# Patient Record
Sex: FEMALE | Race: WHITE | Employment: OTHER | URBAN - METROPOLITAN AREA
[De-identification: names, ages, dates, MRNs, and addresses within clinical notes are randomized per-mention and may not be internally consistent; named-entity substitution may affect disease eponyms.]

---

## 2017-06-09 ENCOUNTER — THERAPY VISIT (OUTPATIENT)
Dept: PHYSICAL THERAPY | Facility: CLINIC | Age: 77
End: 2017-06-09
Payer: MEDICARE

## 2017-06-09 DIAGNOSIS — G89.29 CHRONIC LEFT SHOULDER PAIN: Primary | ICD-10-CM

## 2017-06-09 DIAGNOSIS — M25.512 CHRONIC LEFT SHOULDER PAIN: Primary | ICD-10-CM

## 2017-06-09 PROCEDURE — 97161 PT EVAL LOW COMPLEX 20 MIN: CPT | Mod: GP | Performed by: PHYSICAL THERAPIST

## 2017-06-09 PROCEDURE — 97110 THERAPEUTIC EXERCISES: CPT | Mod: GP | Performed by: PHYSICAL THERAPIST

## 2017-06-09 PROCEDURE — G8982 BODY POS GOAL STATUS: HCPCS | Mod: GP | Performed by: PHYSICAL THERAPIST

## 2017-06-09 PROCEDURE — G8981 BODY POS CURRENT STATUS: HCPCS | Mod: GP | Performed by: PHYSICAL THERAPIST

## 2017-06-09 NOTE — LETTER
DEPARTMENT OF HEALTH AND HUMAN SERVICES  CENTERS FOR MEDICARE & MEDICAID SERVICES    PLAN/UPDATED PLAN OF PROGRESS FOR OUTPATIENT REHABILITATION    PATIENTS NAME:  Svetlana Mendoza     : 1940    PROVID[facility] NUMBER:    [id number]    [id number]     PROVIDER NAME: [facility] PHYSICAL THERAPY    MEDICAL RECORD NUMBER: [id number]     START OF CARE DATE:  SOC Date: 17   TYPE:  PT    PRIMARY/TREATMENT DIAGNOSIS: (Pertinent Medical Diagnosis)  Chronic left shoulder pain    VISITS FROM START OF CARE:  Rxs Used: 1     [place] [facility] Initial Evaluation    Subjective:  Patient is a 76 year old female presenting with rehab left shoulder hpi.   Svetlana Mendoza is a 76 year old female with a left shoulder condition. Condition occurred with:  Unknown cause.  Condition occurred: for unknown reasons. This is a chronic condition  Began 3-4 months ago. 17 Date of MD order.      Patient reports pain:  Upper arm.  Radiates to:  Lower arm and hand. Pain is described as sharp and burning and is intermittent and reported as 9/10.  Associated symptoms:  Loss of strength and loss of motion/stiffness. Pain is the same all the time. Exacerbated by: putting on her pants, reaching overhead, swimming, lifting weights. and relieved by heat (pain creams).  Since onset symptoms are unchanged.  Special testing: non reports. Previous treatment: injection   There was mild improvement following previous treatment. General health as reported by patient is good.      Pertinent medical history includes: Osteoarthritis, high blood pressure and thyroid problems. Medical allergies: no. Other surgeries include:  None reported. Current medications: Thyroid medication, muscle relaxants, meds to increase bone density and high blood pressure medication. Current occupation is Retired. Employment tasks: household tasks.    Barriers include:  None as reported by the patient.    Red flags:  None as reported by  the patient.                     Objective:       Shoulder Evaluation:  ROM:  AROM:    Flexion:  Left:  44    Right:  166  Abduction:  Left: 45   Right:  154  External Rotation:  Left:  64    Right:  62  Extension/Internal Rotation:  Left:  S1    Right:  T7      PROM:    Flexion:  Left:  155 with ERP         Abduction:  Left:  148 with ERP        Strength:    Flexion: Left:3/5 Weak/painful    Pain:    Right: 5/5  Strong/pain free     Pain:   Extension:  Left: 5/5  Strong/pain free    Pain:    Right: 5/5    Strong/pain free  Pain:  Abduction:  Left: 3/5  Weak/painful  Pain:    Right: 5/5     Pain:  Adduction:  Left:/5  Weak/painful   Pain:    Right: 5/5   Strong/pain free    Pain:  Internal Rotation:  Left:4/5   Weak/painful    Pain:    Right: 5/5   Strong/pain free    Pain:  External Rotation:   Left:4/5   Weak/painful    Pain:   Right:5/5   Strong/pain free    Pain:      Stability Testing:  normal    Special Tests:    Left shoulder positive for the following special tests:  Impingement and Bursal  Left shoulder negative for the following special tests:  Rotator cuff tear and Acromioclavicular    Palpation:    Left shoulder tenderness present at:  Supraspinatus  Left shoulder tenderness not present at: Biceps; Infraspinatus; Teres Minor; Subscapularis; Rhomboids; Upper Trap or Bicipital Groove    Mobility Tests:    Glenohumeral inferior left:  Hypomobile      Assessment/Plan:    Patient is a 76 year old female with left side shoulder complaints.    Patient has the following significant findings with corresponding treatment plan.                  Diagnosis 1:  Left shoulder pain / Rotator cuff strain  Pain -  hot/cold therapy, manual therapy, self management, education, directional preference exercise and home program  Decreased ROM/flexibility - manual therapy, therapeutic exercise, therapeutic activity and home program  Decreased joint mobility - manual therapy, therapeutic exercise, therapeutic activity and home  program  Decreased strength - therapeutic exercise, therapeutic activities and home program  Decreased function - therapeutic activities and home program  Impaired posture - neuro re-education, therapeutic activities and home program    Therapy Evaluation Codes:   1) History comprised of:   Personal factors that impact the plan of care:      Age and Language.    Comorbidity factors that impact the plan of care are:      None.     Medications impacting care: Muscle relaxant.  2) Examination of Body Systems comprised of:   Body structures and functions that impact the plan of care:      Shoulder.   Activity limitations that impact the plan of care are:      Dressing, Lifting and reaching.  3) Clinical presentation characteristics are:   Stable/Uncomplicated.  4) Decision-Making    Low complexity using standardized patient assessment instrument and/or measureable assessment of functional outcome.  Cumulative Therapy Evaluation is: Low complexity.    Previous and current functional limitations:  (See Goal Flow Sheet for this information)    Short term and Long term goals: (See Goal Flow Sheet for this information)     Communication ability:  Patient appears to be able to clearly communicate and understand verbal and written communication and follow directions correctly.    Treatment Explanation - The following has been discussed with the patient:   RX ordered/plan of care  Anticipated outcomes  Possible risks and side effects    This patient would benefit from PT intervention to resume normal activities.   Rehab potential is good.  Frequency:  1 X week, once daily  Duration:  for 8 weeks  Discharge Plan:  Achieve all LTG.  Independent in home treatment program.  Reach maximal therapeutic benefit.    Please refer to the daily flowsheet for treatment today, total treatment time and time spent performing 1:1 timed codes.     Caregiver Signature/Credentials _____________________________ Date ________       Treating  "Provider: Clark Lipscomb DPT     I have reviewed and certified the need for these services and plan of treatment while under my care.      PHYSICIAN'S SIGNATURE:   ____________________________________  Date___________                       Adam Frias      Certification period:  Beginning of Cert date period: 06/09/17 to  End of Cert period date: 09/06/17     Functional Level Progress Report: Please see attached \"Goal Flow sheet for Functional level.\"    ____X____ Continue Services or       ________ DC Services                Service dates: From  SOC Date: 06/09/17 date to present                         "

## 2017-06-09 NOTE — PROGRESS NOTES
[place] [facility] Initial Evaluation      Subjective:    Patient is a 76 year old female presenting with rehab left shoulder hpi.   Svetlana Mendoza is a 76 year old female with a left shoulder condition.  Condition occurred with:  Unknown cause.  Condition occurred: for unknown reasons.  This is a chronic condition  Began 3-4 months ago. 6/1/17 Date of MD order.    Patient reports pain:  Upper arm.  Radiates to:  Lower arm and hand.  Pain is described as sharp and burning and is intermittent and reported as 9/10.  Associated symptoms:  Loss of strength and loss of motion/stiffness. Pain is the same all the time.  Exacerbated by: putting on her pants, reaching overhead, swimming, lifting weights. and relieved by heat (pain creams).  Since onset symptoms are unchanged.  Special testing: non reports.  Previous treatment: injection   There was mild improvement following previous treatment.  General health as reported by patient is good.  Pertinent medical history includes:  Osteoarthritis, high blood pressure and thyroid problems.  Medical allergies: no.  Other surgeries include:  None reported.  Current medications:  Thyroid medication, muscle relaxants, meds to increase bone density and high blood pressure medication.  Current occupation is Retired  .    Employment tasks: household tasks.    Barriers include:  None as reported by the patient.    Red flags:  None as reported by the patient.                        Objective:    System                   Shoulder Evaluation:  ROM:  AROM:    Flexion:  Left:  44    Right:  166    Abduction:  Left: 45   Right:  154      External Rotation:  Left:  64    Right:  62            Extension/Internal Rotation:  Left:  S1    Right:  T7    PROM:    Flexion:  Left:  155 with ERP           Abduction:  Left:  148 with ERP                              Strength:    Flexion: Left:3/5 Weak/painful    Pain:    Right: 5/5  Strong/pain free     Pain:   Extension:  Left: 5/5   Strong/pain free    Pain:    Right: 5/5    Strong/pain free  Pain:  Abduction:  Left: 3/5  Weak/painful  Pain:    Right: 5/5     Pain:  Adduction:  Left:/5  Weak/painful   Pain:    Right: 5/5   Strong/pain free    Pain:  Internal Rotation:  Left:4/5   Weak/painful    Pain:    Right: 5/5   Strong/pain free    Pain:  External Rotation:   Left:4/5   Weak/painful    Pain:   Right:5/5   Strong/pain free    Pain:            Stability Testing:  normal      Special Tests:    Left shoulder positive for the following special tests:  Impingement and Bursal  Left shoulder negative for the following special tests:  Rotator cuff tear and Acromioclavicular    Palpation:    Left shoulder tenderness present at:  Supraspinatus  Left shoulder tenderness not present at: Biceps; Infraspinatus; Teres Minor; Subscapularis; Rhomboids; Upper Trap or Bicipital Groove    Mobility Tests:        Glenohumeral inferior left:  Hypomobile                                             General     ROS    Assessment/Plan:      Patient is a 76 year old female with left side shoulder complaints.    Patient has the following significant findings with corresponding treatment plan.                Diagnosis 1:  Left shoulder pain / Rotator cuff strain  Pain -  hot/cold therapy, manual therapy, self management, education, directional preference exercise and home program  Decreased ROM/flexibility - manual therapy, therapeutic exercise, therapeutic activity and home program  Decreased joint mobility - manual therapy, therapeutic exercise, therapeutic activity and home program  Decreased strength - therapeutic exercise, therapeutic activities and home program  Decreased function - therapeutic activities and home program  Impaired posture - neuro re-education, therapeutic activities and home program    Therapy Evaluation Codes:   1) History comprised of:   Personal factors that impact the plan of care:      Age and Language.    Comorbidity factors that impact the  plan of care are:      None.     Medications impacting care: Muscle relaxant.  2) Examination of Body Systems comprised of:   Body structures and functions that impact the plan of care:      Shoulder.   Activity limitations that impact the plan of care are:      Dressing, Lifting and reaching.  3) Clinical presentation characteristics are:   Stable/Uncomplicated.  4) Decision-Making    Low complexity using standardized patient assessment instrument and/or measureable assessment of functional outcome.  Cumulative Therapy Evaluation is: Low complexity.    Previous and current functional limitations:  (See Goal Flow Sheet for this information)    Short term and Long term goals: (See Goal Flow Sheet for this information)     Communication ability:  Patient appears to be able to clearly communicate and understand verbal and written communication and follow directions correctly.  Treatment Explanation - The following has been discussed with the patient:   RX ordered/plan of care  Anticipated outcomes  Possible risks and side effects  This patient would benefit from PT intervention to resume normal activities.   Rehab potential is good.    Frequency:  1 X week, once daily  Duration:  for 8 weeks  Discharge Plan:  Achieve all LTG.  Independent in home treatment program.  Reach maximal therapeutic benefit.    Please refer to the daily flowsheet for treatment today, total treatment time and time spent performing 1:1 timed codes.

## 2017-06-15 ENCOUNTER — THERAPY VISIT (OUTPATIENT)
Dept: PHYSICAL THERAPY | Facility: CLINIC | Age: 77
End: 2017-06-15
Payer: MEDICARE

## 2017-06-15 DIAGNOSIS — G89.29 CHRONIC LEFT SHOULDER PAIN: ICD-10-CM

## 2017-06-15 DIAGNOSIS — M25.512 CHRONIC LEFT SHOULDER PAIN: ICD-10-CM

## 2017-06-15 PROCEDURE — 97110 THERAPEUTIC EXERCISES: CPT | Mod: GP | Performed by: PHYSICAL THERAPIST

## 2017-06-15 NOTE — MR AVS SNAPSHOT
"              After Visit Summary   6/15/2017    Svetlana Mendoza    MRN: [id number]           Patient Information     Date Of Birth          1940        Visit Information        Provider Department      6/15/2017 1:30 PM Teresa Albright, PT [facility] Physical Therapy        Today's Diagnoses     Chronic left shoulder pain           Follow-ups after your visit        Your next 10 appointments already scheduled     Jun 29, 2017  1:30 PM CDT   PAYAL Extremity with Teresa Albright, PT   [facility] Physical Therapy ([facility]  )    [address]  [facility] [address] [phone]              Who to contact     If you have questions or need follow up information about today's clinic visit or your schedule please contact [facility] PHYSICAL THERAPY directly at [phone].  Normal or non-critical lab and imaging results will be communicated to you by MyChart, letter or phone within 4 business days after the clinic has received the results. If you do not hear from us within 7 days, please contact the clinic through MyChart or phone. If you have a critical or abnormal lab result, we will notify you by phone as soon as possible.  Submit refill requests through Call[employer] or call your pharmacy and they will forward the refill request to us. Please allow 3 business days for your refill to be completed.          Additional Information About Your Visit        MyChart Information     CallMD lets you send messages to your doctor, view your test results, renew your prescriptions, schedule appointments and more. To sign up, go to [email or web address] . Click on \"Log in\" on the left side of the screen, which will take you to the Welcome page. Then click on \"Sign up Now\" on the right side of the page.     You will be asked to enter the access code listed below, as well as some personal information. Please follow the directions to create your " username and password.     Your access code is: [email or web address]  Expires: 2017  1:58 PM     Your access code will  in 90 days. If you need help or a new code, please call your [place] clinic or [phone].        Care EveryWhere ID     This is your Care EveryWhere ID. This could be used by other organizations to access your [place] medical records  [id number]         Blood Pressure from Last 3 Encounters:   13 140/56   13 160/74    Weight from Last 3 Encounters:   13 59.8 kg (131 lb 14.4 oz)              We Performed the Following     THERAPEUTIC EXERCISES        Primary Care Provider Office Phone # Fax #    Ankita Coates [phone]       [facility] [address]  [facility] [address]        Thank you!     Thank you for choosing [facility] PHYSICAL THERAPY  for your care. Our goal is always to provide you with excellent care. Hearing back from our patients is one way we can continue to improve our services. Please take a few minutes to complete the written survey that you may receive in the mail after your visit with us. Thank you!             Your Updated Medication List - Protect others around you: Learn how to safely use, store and throw away your medicines at www.disposemymeds.org.          This list is accurate as of: 6/15/17  1:58 PM.  Always use your most recent med list.                   Brand Name Dispense Instructions for use    alendronate 70 MG tablet    FOSAMAX     Take 70 mg by mouth every 7 days       aspirin 81 MG tablet      Take by mouth daily       CALCIUM + D PO          Levothyroxine Sodium 50 MCG Caps          lisinopril-hydrochlorothiazide 20-12.5 MG per tablet    PRINZIDE/ZESTORETIC     Take 1 tablet by mouth daily       naproxen 500 MG tablet    NAPROSYN     Take by mouth 2 times daily (with meals)       omeprazole 20 MG tablet     30 tablet    Take 1 tablet (20 mg) by mouth daily as needed Take 30-60 minutes  before a meal.       simvastatin 40 MG tablet    ZOCOR    90 tablet    Take 1 tablet (40 mg) by mouth At Bedtime       vitamin D 2000 UNITS Caps

## 2018-01-09 ENCOUNTER — TRANSFERRED RECORDS (OUTPATIENT)
Dept: PHYSICAL THERAPY | Facility: CLINIC | Age: 78
End: 2018-01-09

## 2018-01-11 PROBLEM — G89.29 CHRONIC LEFT SHOULDER PAIN: Status: RESOLVED | Noted: 2017-06-09 | Resolved: 2018-01-11

## 2018-01-11 PROBLEM — M25.512 CHRONIC LEFT SHOULDER PAIN: Status: RESOLVED | Noted: 2017-06-09 | Resolved: 2018-01-11

## 2018-01-11 NOTE — PROGRESS NOTES
Subjective:  HPI                    Objective:  System    Physical Exam    General     ROS    Assessment/Plan:    DISCHARGE REPORT    Progress reporting period is from 6/9/17 to 6/15/17.   Patient was seen for 2 visits.  SUBJECTIVE  Subjective: Patient reports the upper arm is better.  The shoulder joint is sore intermittently, maybe related to exercises.  Reaching is easier.  Patient has no pain currently at rest.   Current Pain level: 0/10   Initial Pain level: 6/10   Changes in function: Yes, see goal flow sheet for change in function   Adverse reactions: None;   ,     The subjective and objective information are from the last SOAP note on this patient.    OBJECTIVE  Objective: AROM: 150 deg flex, 155 deg abd, 64 deg ER 5/10 ERP, ext/IR to T8,       ASSESSMENT/PLAN  Updated problem list and treatment plan: Diagnosis 1:  Shoulder pain  Pain -  home program  Decreased ROM/flexibility - home program  Decreased strength - home program  Decreased function - home program  STG/LTGs have been met or progress has been made towards goals:  Yes (See Goal flow sheet completed today.)  Assessment of Progress: Patient has not returned to therapy.  Current status is unknown and discharge G code cannot be reported.  Self Management Plans:  Patient has been instructed in a home treatment program.      The patient failed to complete scheduled/ordered appointments so current information is unknown.  We will discharge this patient from PT.    Recommendations:  Discharge with HEP.    Please refer to the daily flowsheet for treatment today, total treatment time and time spent performing 1:1 timed codes.

## 2018-01-15 ENCOUNTER — THERAPY VISIT (OUTPATIENT)
Dept: PHYSICAL THERAPY | Facility: CLINIC | Age: 78
End: 2018-01-15
Payer: MEDICARE

## 2018-01-15 DIAGNOSIS — M54.42 BILATERAL LOW BACK PAIN WITH LEFT-SIDED SCIATICA: Primary | ICD-10-CM

## 2018-01-15 PROCEDURE — G8981 BODY POS CURRENT STATUS: HCPCS | Mod: GP | Performed by: PHYSICAL THERAPIST

## 2018-01-15 PROCEDURE — 97161 PT EVAL LOW COMPLEX 20 MIN: CPT | Mod: GP | Performed by: PHYSICAL THERAPIST

## 2018-01-15 PROCEDURE — G8982 BODY POS GOAL STATUS: HCPCS | Mod: GP | Performed by: PHYSICAL THERAPIST

## 2018-01-15 PROCEDURE — 97110 THERAPEUTIC EXERCISES: CPT | Mod: GP | Performed by: PHYSICAL THERAPIST

## 2018-01-15 NOTE — LETTER
DEPARTMENT OF HEALTH AND HUMAN SERVICES  CENTERS FOR MEDICARE & MEDICAID SERVICES    PLAN/UPDATED PLAN OF PROGRESS FOR OUTPATIENT REHABILITATION    PATIENTS NAME:  Svetlana Mendoza   : 1940  PROVIDER NUMBER:    [id number]  [id number]   PROVIDER NAME: [facility] PHYSICAL THERAPY  MEDICAL RECORD NUMBER: [id number]   START OF CARE DATE:  SOC Date: 01/15/18   TYPE:  PT  PRIMARY/TREATMENT DIAGNOSIS: (Pertinent Medical Diagnosis)  Bilateral low back pain with left-sided sciatica    VISITS FROM START OF CARE:  Rxs Used: 1     [place] [facility] Initial Evaluation -- Lumbar    Date: January 15, 2018  Svetlana Mendoza is a 77 year old female with a lumbar condition.   Referral: 2018  Work mechanical stresses:    Employment status:  retired  Leisure mechanical stresses: walking  Functional disability score (TRU/STarT Back):    VAS score (0-10): 6  Patient goals/expectations:  Decrease back and leg pain  HISTORY:  Present symptoms: Central to B LBP, L leg pain  Pain quality (sharp/shooting/stabbing/aching/burning/cramping):  sharp   Paresthesia (yes/no):  no  Present since (onset date): Many years, worsening over the past 3 months.     Symptoms (improving/unchanging/worsening):  worsening.   Symptoms commenced as a result of: No apparent reason   Condition occurred in the following environment:   Home   Symptoms at onset (back/thigh/leg): B LB, B legs  Constant symptoms (back/thigh/leg):   Intermittent symptoms (back/thigh/leg): B LB, B legs  Symptoms are made worse with the following: Always Sitting 30 minutes, Sometimes rising sit to stand, and Always Lying   Symptoms are made better with the following: Sometimes Walking after sitting and Other - wearing belt  Disturbed sleep (yes/no):  no Sleeping postures (prone/sup/side R/L): B sides  Previous episodes (0/1-5/6-10/11+): 10+ Year of first episode: Many years  Previous history: Several year history of back pain  Previous  treatments: PT  Specific Questions:  Cough/Sneeze/Strain (pos/neg): neg  Bowel/Bladder (normal/abnormal): normal  Gait (normal/abnormal): normal  Medications (nil/NSAIDS/analg/steroids/anticoag/other):  None  Medical allergies:  None  General health (excellent/good/fair/poor):  good  PATIENTS NAME:  Svetlana Mendoza   : 1940    Pertinent medical history:  Rheumatoid Arthritis, Osteoarthritis, High blood pressure and Thyroid problems  Imaging (None/Xray/MRI/Other):  None  Recent or major surgery (yes/no):  no  Night pain (yes/no): no  Accidents (yes/no): no  Unexplained weight loss (yes/no): no  Barriers at home: no  Other red flags: no  EXAMINATION  Posture:   Sitting (good/fair/poor): fair  Standing (good/fair/poor):good  Lordosis (red/acc/normal): normal  Correction of posture (better/worse/no effect): better for awhile  Lateral Shift (right/left/nil): nil  Relevant (yes/no):    Other Observations:   Neurological:  Motor deficit:  Strong and equal B LE Reflexes:    Sensory deficit:  None   Dural signs:  (-)slump B  Movement Loss:   Nawaf Mod Min Nil Pain   Flexion x    Prod LBP   Extension  x   Dec LBP, NE on LE   Side Gliding R    x NE   Side Gliding L    x NE   Test Movements:   During: produces, abolishes, increases, decreases, no effect, centralizing, peripheralizing   After: better, worse, no better, no worse, no effect, centralized, peripheralized  Pretest symptoms standin/10 L LE   Symptoms During Symptoms After ROM increased ROM decreased No Effect   FIS Produces  LBP  No Worse         Rep FIS          EIS Decreases    No Better         Rep EIS Decreases    Better         Pretest symptoms lyin/10 L LE    Symptoms During Symptoms After ROM increased ROM decreased No Effect   BRIAN          Rep BRIAN          EIL Produces   LBP Worse         Rep EIL Decreases  Centralising    Better    x     If required, pretest symptoms:    Symptoms During Symptoms After ROM increased ROM decreased No Effect   SGIS  - R No Effect    No Effect         Rep SGIS - R          SGIS - L No Effect    No Effect         Rep SGIS - L          Static Tests:  Sitting slouched:     Sitting erect:    Standing slouched    Standing erect:    Lying prone in extension:  Dec Long sitting:    Other Tests:   Provisional Classification:  Derangement -B LB, symptoms below knee, varying symmetry  Principle of Management:  Education:  Centralization   Equipment provided:  Has a lumbar roll, reviewed use  Mechanical therapy (Y/N):  Y-assessing   Extension principle:  EIS, EIL 6x/day Lateral Principle:    Flexion principle:      Other:   ASSESSMENT/PLAN:  Patient is a 77 year old female with lumbar complaints.    Patient has the following significant findings with corresponding treatment plan.                Diagnosis 1:  LBP  Pain -  manual therapy, directional preference exercise and home program  Decreased ROM/flexibility - manual therapy, therapeutic exercise and home program  Decreased function - therapeutic activities and home program  Therapy Evaluation Codes:   1) History comprised of:   Personal factors that impact the plan of care:      Time since onset of symptoms.    Comorbidity factors that impact the plan of care are:      None.     Medications impacting care: None.  2) Examination of Body Systems comprised of:   Body structures and functions that impact the plan of care:      Lumbar spine.   Activity limitations that impact the plan of care are:      Bending and Sitting.  3) Clinical presentation characteristics are:   Stable/Uncomplicated.  4) Decision-Making    Low complexity using standardized patient assessment instrument and/or measureable assessment of functional outcome.  Cumulative Therapy Evaluation is: Low complexity.    Previous and current functional limitations:  (See Goal Flow Sheet for this information)    Short term and Long term goals: (See Goal Flow Sheet for this information)   PATIENTS NAME:  Svetlana Mendoza   :  "1940    Communication ability:  Patient appears to be able to clearly communicate and understand verbal and written communication and follow directions correctly.  Treatment Explanation - The following has been discussed with the patient:   RX ordered/plan of care  Anticipated outcomes  Possible risks and side effects  This patient would benefit from PT intervention to resume normal activities.   Rehab potential is good.  Frequency:  1 X week, once daily  Duration:  for 8 weeks  Discharge Plan:  Achieve all LTG.  Independent in home treatment program.  Reach maximal therapeutic benefit.        Caregiver Signature/Credentials _____________________________ Date ________      Treating Provider: Angelic Albright, PT   I have reviewed and certified the need for these services and plan of treatment while under my care.        PHYSICIAN'S SIGNATURE:   _________________________________________  Date___________   Nyasia Barlow MD    Certification period:  Beginning of Cert date period: 01/15/18 to  End of Cert period date: 04/14/18     Functional Level Progress Report: Please see attached \"Goal Flow sheet for Functional level.\"    ____X____ Continue Services or       ________ DC Services                Service dates: From  SOC Date: 01/15/18 date to present                         "

## 2018-01-15 NOTE — MR AVS SNAPSHOT
"              After Visit Summary   1/15/2018    Svetlana Mendoza    MRN: [id number]           Patient Information     Date Of Birth          1940        Visit Information        Provider Department      1/15/2018 10:20 AM Teresa Albright, PT [facility] Physical Therapy        Today's Diagnoses     Bilateral low back pain with left-sided sciatica    -  1       Follow-ups after your visit        Your next 10 appointments already scheduled     Jan 22, 2018  1:30 PM CST   PAYAL Spine with Teresa Albright, PT   [facility] Physical Therapy ([facility]  )    [address]  [facility] [address]   [phone]            Jan 29, 2018  1:30 PM CST   PAYAL Spine with Teresa Albright, PT   [facility] Physical Therapy ([facility]  )    [address]  [facility] [id number] [phone]              Who to contact     If you have questions or need follow up information about today's clinic visit or your schedule please contact [facility] PHYSICAL THERAPY directly at [phone].  Normal or non-critical lab and imaging results will be communicated to you by MyChart, letter or phone within 4 business days after the clinic has received the results. If you do not hear from us within 7 days, please contact the clinic through [employer] or phone. If you have a critical or abnormal lab result, we will notify you by phone as soon as possible.  Submit refill requests through Qianmi or call your pharmacy and they will forward the refill request to us. Please allow 3 business days for your refill to be completed.          Additional Information About Your Visit        [employer] Information     Qianmi lets you send messages to your doctor, view your test results, renew your prescriptions, schedule appointments and more. To sign up, go to [email or web address] . Click on \"Log in\" on the left side of the screen, which will take " "you to the Welcome page. Then click on \"Sign up Now\" on the right side of the page.     You will be asked to enter the access code listed below, as well as some personal information. Please follow the directions to create your username and password.     Your access code is: [email or web address]  Expires: 4/15/2018 12:13 PM     Your access code will  in 90 days. If you need help or a new code, please call your [facility] clinic or [phone].        Care EveryWhere ID     This is your Care EveryWhere ID. This could be used by other organizations to access your [facility] medical records  [id number]         Blood Pressure from Last 3 Encounters:   13 140/56   13 160/74    Weight from Last 3 Encounters:   13 59.8 kg (131 lb 14.4 oz)              We Performed the Following     HC PT EVAL, LOW COMPLEXITY     PAYAL INITIAL EVAL REPORT     PAYAL RE-CERT REPORT     THERAPEUTIC EXERCISES        Primary Care Provider Office Phone # Fax #    Ankita Coates MD PhD [phone]       [address]  [facility] [address]        Equal Access to Services     [facility] CenterKEYLA : Hadii aad ku hadasho Soomaali, waaxda luqadaha, qaybta kaalmada adeegyada, vinny lepe haydemarcusn yanely staleyn ah. So [facility] [phone].    ATENCIÓN: Si habla español, tiene a leavitt disposición servicios gratuitos de asistencia lingüística. Llame al [phone].    We comply with applicable federal civil rights laws and [place] laws. We do not discriminate on the basis of race, color, national origin, age, disability, sex, sexual orientation, or gender identity.            Thank you!     Thank you for choosing [facility] PHYSICAL THERAPY  for your care. Our goal is always to provide you with excellent care. Hearing back from our patients is one way we can continue to improve our services. Please take a few minutes to complete the written survey that you may receive in the mail after your visit with us. Thank you!      "        Your Updated Medication List - Protect others around you: Learn how to safely use, store and throw away your medicines at www.disposemymeds.org.          This list is accurate as of: 1/15/18 12:13 PM.  Always use your most recent med list.                   Brand Name Dispense Instructions for use Diagnosis    alendronate 70 MG tablet    FOSAMAX     Take 70 mg by mouth every 7 days        aspirin 81 MG tablet      Take by mouth daily        CALCIUM + D PO           Levothyroxine Sodium 50 MCG Caps           lisinopril-hydrochlorothiazide 20-12.5 MG per tablet    PRINZIDE/ZESTORETIC     Take 1 tablet by mouth daily        naproxen 500 MG tablet    NAPROSYN     Take by mouth 2 times daily (with meals)        omeprazole 20 MG tablet     30 tablet    Take 1 tablet (20 mg) by mouth daily as needed Take 30-60 minutes before a meal.    GERD (gastroesophageal reflux disease)       simvastatin 40 MG tablet    ZOCOR    90 tablet    Take 1 tablet (40 mg) by mouth At Bedtime    Hyperlipidemia LDL goal <130       vitamin D 2000 UNITS Caps

## 2018-01-15 NOTE — PROGRESS NOTES
[place] [facility] Initial Evaluation -- Lumbar    Date: January 15, 2018  Svetlana Mendoza is a 77 year old female with a lumbar condition.   Referral: 1/9/2018  Work mechanical stresses:    Employment status:  retired  Leisure mechanical stresses: walking  Functional disability score (TRU/STarT Back):    VAS score (0-10): 6  Patient goals/expectations:  Decrease back and leg pain    HISTORY:    Present symptoms: Central to B LBP, L leg pain  Pain quality (sharp/shooting/stabbing/aching/burning/cramping):  sharp   Paresthesia (yes/no):  no    Present since (onset date): Many years, worsening over the past 3 months.     Symptoms (improving/unchanging/worsening):  worsening.     Symptoms commenced as a result of: No apparent reason   Condition occurred in the following environment:   Home     Symptoms at onset (back/thigh/leg): B LB, B legs  Constant symptoms (back/thigh/leg):   Intermittent symptoms (back/thigh/leg): B LB, B legs    Symptoms are made worse with the following: Always Sitting 30 minutes, Sometimes rising sit to stand, and Always Lying   Symptoms are made better with the following: Sometimes Walking after sitting and Other - wearing belt    Disturbed sleep (yes/no):  no Sleeping postures (prone/sup/side R/L): B sides    Previous episodes (0/1-5/6-10/11+): 10+ Year of first episode: Many years    Previous history: Several year history of back pain  Previous treatments: PT      Specific Questions:  Cough/Sneeze/Strain (pos/neg): neg  Bowel/Bladder (normal/abnormal): normal  Gait (normal/abnormal): normal  Medications (nil/NSAIDS/analg/steroids/anticoag/other):  None  Medical allergies:  None  General health (excellent/good/fair/poor):  good  Pertinent medical history:  Rheumatoid Arthritis, Osteoarthritis, High blood pressure and Thyroid problems  Imaging (None/Xray/MRI/Other):  None  Recent or major surgery (yes/no):  no  Night pain (yes/no): no  Accidents (yes/no): no  Unexplained weight  loss (yes/no): no  Barriers at home: no  Other red flags: no    EXAMINATION    Posture:   Sitting (good/fair/poor): fair  Standing (good/fair/poor):good  Lordosis (red/acc/normal): normal  Correction of posture (better/worse/no effect): better for awhile    Lateral Shift (right/left/nil): nil  Relevant (yes/no):    Other Observations:     Neurological:    Motor deficit:  Strong and equal B LE Reflexes:    Sensory deficit:  None   Dural signs:  (-)slump B    Movement Loss:   Nawaf Mod Min Nil Pain   Flexion x    Prod LBP   Extension  x   Dec LBP, NE on LE   Side Gliding R    x NE   Side Gliding L    x NE     Test Movements:   During: produces, abolishes, increases, decreases, no effect, centralizing, peripheralizing   After: better, worse, no better, no worse, no effect, centralized, peripheralized    Pretest symptoms standin/10 L LE   Symptoms During Symptoms After ROM increased ROM decreased No Effect   FIS Produces  LBP  No Worse         Rep FIS          EIS Decreases    No Better         Rep EIS Decreases    Better         Pretest symptoms lyin/10 L LE    Symptoms During Symptoms After ROM increased ROM decreased No Effect   BRIAN          Rep BRIAN          EIL Produces   LBP Worse         Rep EIL Decreases  Centralising    Better    x     If required, pretest symptoms:    Symptoms During Symptoms After ROM increased ROM decreased No Effect   SGIS - R No Effect    No Effect         Rep SGIS - R          SGIS - L No Effect    No Effect         Rep SGIS - L            Static Tests:  Sitting slouched:     Sitting erect:    Standing slouched    Standing erect:    Lying prone in extension:  Dec Long sitting:      Other Tests:     Provisional Classification:  Derangement -B LB, symptoms below knee, varying symmetry    Principle of Management:  Education:  Centralization   Equipment provided:  Has a lumbar roll, reviewed use  Mechanical therapy (Y/N):  Y-assessing   Extension principle:  EIS, EIL 6x/day Lateral  Principle:    Flexion principle:      Other:     ASSESSMENT/PLAN:    Patient is a 77 year old female with lumbar complaints.    Patient has the following significant findings with corresponding treatment plan.                Diagnosis 1:  LBP  Pain -  manual therapy, directional preference exercise and home program  Decreased ROM/flexibility - manual therapy, therapeutic exercise and home program  Decreased function - therapeutic activities and home program    Therapy Evaluation Codes:   1) History comprised of:   Personal factors that impact the plan of care:      Time since onset of symptoms.    Comorbidity factors that impact the plan of care are:      None.     Medications impacting care: None.  2) Examination of Body Systems comprised of:   Body structures and functions that impact the plan of care:      Lumbar spine.   Activity limitations that impact the plan of care are:      Bending and Sitting.  3) Clinical presentation characteristics are:   Stable/Uncomplicated.  4) Decision-Making    Low complexity using standardized patient assessment instrument and/or measureable assessment of functional outcome.  Cumulative Therapy Evaluation is: Low complexity.    Previous and current functional limitations:  (See Goal Flow Sheet for this information)    Short term and Long term goals: (See Goal Flow Sheet for this information)     Communication ability:  Patient appears to be able to clearly communicate and understand verbal and written communication and follow directions correctly.  Treatment Explanation - The following has been discussed with the patient:   RX ordered/plan of care  Anticipated outcomes  Possible risks and side effects  This patient would benefit from PT intervention to resume normal activities.   Rehab potential is good.    Frequency:  1 X week, once daily  Duration:  for 8 weeks  Discharge Plan:  Achieve all LTG.  Independent in home treatment program.  Reach maximal therapeutic  benefit.    Please refer to the daily flowsheet for treatment today, total treatment time and time spent performing 1:1 timed codes.

## 2018-01-22 ENCOUNTER — THERAPY VISIT (OUTPATIENT)
Dept: PHYSICAL THERAPY | Facility: CLINIC | Age: 78
End: 2018-01-22
Payer: MEDICARE

## 2018-01-22 DIAGNOSIS — M54.42 BILATERAL LOW BACK PAIN WITH LEFT-SIDED SCIATICA: ICD-10-CM

## 2018-01-22 PROCEDURE — 97110 THERAPEUTIC EXERCISES: CPT | Mod: GP | Performed by: PHYSICAL THERAPIST

## 2018-01-22 PROCEDURE — 97140 MANUAL THERAPY 1/> REGIONS: CPT | Mod: GP | Performed by: PHYSICAL THERAPIST

## 2018-01-29 ENCOUNTER — THERAPY VISIT (OUTPATIENT)
Dept: PHYSICAL THERAPY | Facility: CLINIC | Age: 78
End: 2018-01-29
Payer: MEDICARE

## 2018-01-29 DIAGNOSIS — M54.42 BILATERAL LOW BACK PAIN WITH LEFT-SIDED SCIATICA: ICD-10-CM

## 2018-01-29 PROCEDURE — 97140 MANUAL THERAPY 1/> REGIONS: CPT | Mod: GP | Performed by: PHYSICAL THERAPIST

## 2018-01-29 PROCEDURE — 97110 THERAPEUTIC EXERCISES: CPT | Mod: GP | Performed by: PHYSICAL THERAPIST

## 2018-02-05 ENCOUNTER — THERAPY VISIT (OUTPATIENT)
Dept: PHYSICAL THERAPY | Facility: CLINIC | Age: 78
End: 2018-02-05
Payer: MEDICARE

## 2018-02-05 DIAGNOSIS — M54.42 BILATERAL LOW BACK PAIN WITH LEFT-SIDED SCIATICA: ICD-10-CM

## 2018-02-05 PROCEDURE — 97530 THERAPEUTIC ACTIVITIES: CPT | Mod: GP | Performed by: PHYSICAL THERAPIST

## 2018-02-05 PROCEDURE — 97140 MANUAL THERAPY 1/> REGIONS: CPT | Mod: GP | Performed by: PHYSICAL THERAPIST

## 2018-02-05 PROCEDURE — 97110 THERAPEUTIC EXERCISES: CPT | Mod: GP | Performed by: PHYSICAL THERAPIST

## 2018-02-12 ENCOUNTER — THERAPY VISIT (OUTPATIENT)
Dept: PHYSICAL THERAPY | Facility: CLINIC | Age: 78
End: 2018-02-12
Payer: MEDICARE

## 2018-02-12 DIAGNOSIS — M54.42 BILATERAL LOW BACK PAIN WITH LEFT-SIDED SCIATICA: ICD-10-CM

## 2018-02-12 PROCEDURE — 97140 MANUAL THERAPY 1/> REGIONS: CPT | Mod: GP | Performed by: PHYSICAL THERAPIST

## 2018-02-12 PROCEDURE — 97110 THERAPEUTIC EXERCISES: CPT | Mod: GP | Performed by: PHYSICAL THERAPIST

## 2018-02-19 ENCOUNTER — THERAPY VISIT (OUTPATIENT)
Dept: PHYSICAL THERAPY | Facility: CLINIC | Age: 78
End: 2018-02-19
Payer: MEDICARE

## 2018-02-19 DIAGNOSIS — M54.42 BILATERAL LOW BACK PAIN WITH LEFT-SIDED SCIATICA: ICD-10-CM

## 2018-02-19 PROCEDURE — 97140 MANUAL THERAPY 1/> REGIONS: CPT | Mod: GP | Performed by: PHYSICAL THERAPIST

## 2018-02-19 PROCEDURE — 97110 THERAPEUTIC EXERCISES: CPT | Mod: GP | Performed by: PHYSICAL THERAPIST

## 2018-02-26 ENCOUNTER — THERAPY VISIT (OUTPATIENT)
Dept: PHYSICAL THERAPY | Facility: CLINIC | Age: 78
End: 2018-02-26
Payer: MEDICARE

## 2018-02-26 DIAGNOSIS — M54.42 BILATERAL LOW BACK PAIN WITH LEFT-SIDED SCIATICA: ICD-10-CM

## 2018-02-26 PROCEDURE — 97140 MANUAL THERAPY 1/> REGIONS: CPT | Mod: GP | Performed by: PHYSICAL THERAPIST

## 2018-02-26 PROCEDURE — 97110 THERAPEUTIC EXERCISES: CPT | Mod: GP | Performed by: PHYSICAL THERAPIST

## 2018-03-15 ENCOUNTER — THERAPY VISIT (OUTPATIENT)
Dept: PHYSICAL THERAPY | Facility: CLINIC | Age: 78
End: 2018-03-15
Payer: MEDICARE

## 2018-03-15 DIAGNOSIS — M54.42 BILATERAL LOW BACK PAIN WITH LEFT-SIDED SCIATICA: ICD-10-CM

## 2018-03-15 PROCEDURE — G8981 BODY POS CURRENT STATUS: HCPCS | Mod: GP | Performed by: PHYSICAL THERAPIST

## 2018-03-15 PROCEDURE — G8982 BODY POS GOAL STATUS: HCPCS | Mod: GP | Performed by: PHYSICAL THERAPIST

## 2018-03-15 PROCEDURE — 97140 MANUAL THERAPY 1/> REGIONS: CPT | Mod: GP | Performed by: PHYSICAL THERAPIST

## 2018-03-15 PROCEDURE — 97110 THERAPEUTIC EXERCISES: CPT | Mod: GP | Performed by: PHYSICAL THERAPIST

## 2018-03-15 NOTE — MR AVS SNAPSHOT
"              After Visit Summary   3/15/2018    Svetlana Mendoza    MRN: [id number]           Patient Information     Date Of Birth          1940        Visit Information        Provider Department      3/15/2018 1:15 PM Teresa Albright, PT; [employer] [facility] Physical Therapy        Today's Diagnoses     Bilateral low back pain with left-sided sciatica           Follow-ups after your visit        Your next 10 appointments already scheduled     Apr 02, 2018  1:30 PM CDT   [facility] Spine with Teresa Albright PT   [facility] Physical Therapy ([facility]  )    [address]  [facility] [address] [phone]              Who to contact     If you have questions or need follow up information about today's clinic visit or your schedule please contact [facility] PHYSICAL THERAPY directly at [phone].  Normal or non-critical lab and imaging results will be communicated to you by [employer], letter or phone within 4 business days after the clinic has received the results. If you do not hear from us within 7 days, please contact the clinic through [employer] or phone. If you have a critical or abnormal lab result, we will notify you by phone as soon as possible.  Submit refill requests through [employer] or call your pharmacy and they will forward the refill request to us. Please allow 3 business days for your refill to be completed.          Additional Information About Your Visit        [employer] Information     [employer] lets you send messages to your doctor, view your test results, renew your prescriptions, schedule appointments and more. To sign up, go to [email or web address] . Click on \"Log in\" on the left side of the screen, which will take you to the Welcome page. Then click on \"Sign up Now\" on the right side of the page.     You will be asked to enter the access code listed below, as well as some personal information. " Please follow the directions to create your username and password.     Your access code is: [email or web address]  Expires: 4/15/2018  1:13 PM     Your access code will  in 90 days. If you need help or a new code, please call your [place] clinic or [phone].        Care EveryWhere ID     This is your Care EveryWhere ID. This could be used by other organizations to access your [place] medical records  [id number]         Blood Pressure from Last 3 Encounters:   13 140/56   13 160/74    Weight from Last 3 Encounters:   13 59.8 kg (131 lb 14.4 oz)              We Performed the Following     [facility] PROGRESS NOTES REPORT     PAYAL RE-CERT REPORT     MANUAL THER TECH,1+REGIONS,EA 15 MIN     THERAPEUTIC EXERCISES        Primary Care Provider Office Phone # Fax #    Ankita Coates MD PhD [phone]       [address]  [facility] [address]        Equal Access to Services     [facility] UnitKEYLA : Hadii aad ku hadasho Soomaali, waaxda luqadaha, qaybta kaalmada adeegyada, waxay idiin hayaan adeeg kharash la'aan . So [facility] [phone].    ATENCIÓN: Si habla español, tiene a leavitt disposición servicios gratuitos de asistencia lingüística. Llame al [phone].    We comply with applicable federal civil rights laws and [place] laws. We do not discriminate on the basis of race, color, national origin, age, disability, sex, sexual orientation, or gender identity.            Thank you!     Thank you for choosing [facility] PHYSICAL THERAPY  for your care. Our goal is always to provide you with excellent care. Hearing back from our patients is one way we can continue to improve our services. Please take a few minutes to complete the written survey that you may receive in the mail after your visit with us. Thank you!             Your Updated Medication List - Protect others around you: Learn how to safely use, store and throw away your medicines at www.disposemymeds.org.          This list  is accurate as of 3/15/18  2:57 PM.  Always use your most recent med list.                   Brand Name Dispense Instructions for use Diagnosis    alendronate 70 MG tablet    FOSAMAX     Take 70 mg by mouth every 7 days        aspirin 81 MG tablet      Take by mouth daily        CALCIUM + D PO           Levothyroxine Sodium 50 MCG Caps           lisinopril-hydrochlorothiazide 20-12.5 MG per tablet    PRINZIDE/ZESTORETIC     Take 1 tablet by mouth daily        naproxen 500 MG tablet    NAPROSYN     Take by mouth 2 times daily (with meals)        omeprazole 20 MG tablet     30 tablet    Take 1 tablet (20 mg) by mouth daily as needed Take 30-60 minutes before a meal.    GERD (gastroesophageal reflux disease)       simvastatin 40 MG tablet    ZOCOR    90 tablet    Take 1 tablet (40 mg) by mouth At Bedtime    Hyperlipidemia LDL goal <130       vitamin D 2000 UNITS Caps

## 2018-03-15 NOTE — PROGRESS NOTES
"Subjective:  HPI                    Objective:  System    Physical Exam    General     ROS    Assessment/Plan:    PROGRESS  REPORT    Progress reporting period is from 1/15/2018 to 3/15/2018.   Patient has been seen for 8 visits.    SUBJECTIVE  Subjective: Patient reports good overall improvement.  Is no longer having pain during the day or with rising sit to stand.  Had also been sleeping well until the past 3 night.  Reports \"great pain\" the past 3 nights that disturbed sleep.    Current Pain level: 0/10.     Initial Pain level: 6/10.   Changes in function:  Yes (See Goal flowsheet attached for changes in current functional level)  Adverse reaction to treatment or activity: None    OBJECTIVE    Objective: LROM: LROM has improved to nil loss flexion with pulling in B thighs L>R and Nil/min loss extension without pain.  Slump and SLR test produce pulling/tightness on L>R.     ASSESSMENT/PLAN  Updated problem list and treatment plan: Diagnosis 1:  LBP  Pain -  manual therapy, directional preference exercise and home program  Decreased ROM/flexibility - manual therapy, therapeutic exercise and home program  Decreased function - therapeutic activities and home program  STG/LTGs have been met or progress has been made towards goals:  Yes (See Goal flow sheet completed today.)  Assessment of Progress: The patient's condition is improving.  Self Management Plans:  Patient has been instructed in a home treatment program.  I have re-evaluated this patient and find that the nature, scope, duration and intensity of the therapy is appropriate for the medical condition of the patient.  Svetlana continues to require the following intervention to meet STG and LTG's:  PT    Recommendations:  This patient would benefit from continued therapy to address disturbed sleep.   Patient will be out of town for the next 2 weeks.  Will then continue PT.  Frequency:  1 X week, once daily  Duration:  for 4 weeks          Please refer to the daily " flowsheet for treatment today, total treatment time and time spent performing 1:1 timed codes.

## 2018-03-15 NOTE — LETTER
"DEPARTMENT OF HEALTH AND HUMAN SERVICES  CENTERS FOR MEDICARE & MEDICAID SERVICES    PLAN/UPDATED PLAN OF PROGRESS FOR OUTPATIENT REHABILITATION    PATIENTS NAME:  Svetlana Mendoza     : 1940    PROVIDER NUMBER:    [id number]    PsychiatricN:  [id number]     PROVIDER NAME: [facility]AL [facility] PHYSICAL THERAPY    MEDICAL RECORD NUMBER: [id number]     START OF CARE DATE:  SOC Date: 01/15/18   TYPE:  PT    PRIMARY/TREATMENT DIAGNOSIS: (Pertinent Medical Diagnosis)  Bilateral low back pain with left-sided sciatica    VISITS FROM START OF CARE:  Rxs Used: 8     PROGRESS  REPORT  Progress reporting period is from 1/15/2018 to 3/15/2018.     Patient has been seen for 8 visits.    SUBJECTIVE  Subjective: Patient reports good overall improvement.  Is no longer having pain during the day or with rising sit to stand.  Had also been sleeping well until the past 3 night.  Reports \"great pain\" the past 3 nights that disturbed sleep.      Current Pain level: 0/10.     Initial Pain level: 6/10.   Changes in function:  Yes (See Goal flowsheet attached for changes in current functional level)  Adverse reaction to treatment or activity: None    OBJECTIVE    Objective: LROM: LROM has improved to nil loss flexion with pulling in B thighs L>R and Nil/min loss extension without pain.  Slump and SLR test produce pulling/tightness on L>R.     ASSESSMENT/PLAN  Updated problem list and treatment plan:     Diagnosis 1:  LBP  Pain -  manual therapy, directional preference exercise and home program  Decreased ROM/flexibility - manual therapy, therapeutic exercise and home program  Decreased function - therapeutic activities and home program    STG/LTGs have been met or progress has been made towards goals:  Yes (See Goal flow sheet completed today.)    Assessment of Progress: The patient's condition is improving.    Self Management Plans:  Patient has been instructed in a home treatment program.  I have re-evaluated this patient and " "find that the nature, scope, duration and intensity of the therapy is appropriate for the medical condition of the patient.    Svetlana continues to require the following intervention to meet STG and LTG's:  PT    Recommendations:  This patient would benefit from continued therapy to address disturbed sleep.   Patient will be out of town for the next 2 weeks.  Will then continue PT.  Frequency:  1 X week, once daily  Duration:  for 4 weeks    Please refer to the daily flowsheet for treatment today, total treatment time and time spent performing 1:1 timed codes.    Caregiver Signature/Credentials _____________________________ Date ________       Treating Provider: Angelic Albright, PT     I have reviewed and certified the need for these services and plan of treatment while under my care.        PHYSICIAN'S SIGNATURE:   _____________________________________  Date___________                     Liliane Parada MD    Certification period:  Beginning of Cert date period: 03/15/18 to  End of Cert period date: 05/14/18     Functional Level Progress Report: Please see attached \"Goal Flow sheet for Functional level.\"    ____X____ Continue Services or       ________ DC Services                Service dates: From  SOC Date: 01/15/18 date to present                         "

## 2018-04-02 ENCOUNTER — THERAPY VISIT (OUTPATIENT)
Dept: PHYSICAL THERAPY | Facility: CLINIC | Age: 78
End: 2018-04-02
Payer: MEDICARE

## 2018-04-02 DIAGNOSIS — M54.42 BILATERAL LOW BACK PAIN WITH LEFT-SIDED SCIATICA: ICD-10-CM

## 2018-04-02 PROCEDURE — 97140 MANUAL THERAPY 1/> REGIONS: CPT | Mod: GP | Performed by: PHYSICAL THERAPIST

## 2018-04-02 PROCEDURE — 97110 THERAPEUTIC EXERCISES: CPT | Mod: GP | Performed by: PHYSICAL THERAPIST

## 2018-04-16 ENCOUNTER — THERAPY VISIT (OUTPATIENT)
Dept: PHYSICAL THERAPY | Facility: CLINIC | Age: 78
End: 2018-04-16
Payer: MEDICARE

## 2018-04-16 DIAGNOSIS — M54.42 BILATERAL LOW BACK PAIN WITH LEFT-SIDED SCIATICA: ICD-10-CM

## 2018-04-16 PROCEDURE — 97140 MANUAL THERAPY 1/> REGIONS: CPT | Mod: GP | Performed by: PHYSICAL THERAPIST

## 2018-04-16 PROCEDURE — 97110 THERAPEUTIC EXERCISES: CPT | Mod: GP | Performed by: PHYSICAL THERAPIST

## 2018-04-30 ENCOUNTER — THERAPY VISIT (OUTPATIENT)
Dept: PHYSICAL THERAPY | Facility: CLINIC | Age: 78
End: 2018-04-30
Payer: MEDICARE

## 2018-04-30 DIAGNOSIS — M54.42 BILATERAL LOW BACK PAIN WITH LEFT-SIDED SCIATICA: ICD-10-CM

## 2018-04-30 PROCEDURE — G8982 BODY POS GOAL STATUS: HCPCS | Mod: GP | Performed by: PHYSICAL THERAPIST

## 2018-04-30 PROCEDURE — G8981 BODY POS CURRENT STATUS: HCPCS | Mod: GP | Performed by: PHYSICAL THERAPIST

## 2018-04-30 PROCEDURE — 97140 MANUAL THERAPY 1/> REGIONS: CPT | Mod: GP | Performed by: PHYSICAL THERAPIST

## 2018-04-30 PROCEDURE — 97110 THERAPEUTIC EXERCISES: CPT | Mod: GP | Performed by: PHYSICAL THERAPIST

## 2018-04-30 NOTE — PROGRESS NOTES
Subjective:  HPI                    Objective:  System    Physical Exam    General     ROS    Assessment/Plan:    PROGRESS  REPORT    Progress reporting period is from 3/15/2018 to 4/30/2018.   Patient has been seen for 4 visits during this reporting period and for 11 visits total.    SUBJECTIVE  Subjective: Patient reports symptoms are the same as last visit.  Does not really think about the pain very much during the day.  No difficulty sitting or rising from a chair.  Symptoms  most bothersome at night.  Intermittent disturbed sleep.  Since the last visit has had 5 good nights and about 5 nights with disturbed sleep.  Noticed recently feeling worse following going to the pool so did not go last week.    Current Pain level: 0/10.     Initial Pain level: 6/10.   Changes in function:  Yes (See Goal flowsheet attached for changes in current functional level)  Adverse reaction to treatment or activity: None    OBJECTIVE    Objective: LROM: Patient demonstrates nil loss of flexion and extension.  Reports ERP with flexion.  SLR (-) today, experiences B stretch but no pain.     ASSESSMENT/PLAN  Updated problem list and treatment plan: Diagnosis 1:  LBP  Pain -  manual therapy, directional preference exercise and home program  Decreased ROM/flexibility - manual therapy, therapeutic exercise and home program  Decreased function - therapeutic activities and home program  STG/LTGs have been met or progress has been made towards goals:  Yes (See Goal flow sheet completed today.)  Assessment of Progress: The patient's condition is improving.  Self Management Plans:  Patient has been instructed in a home treatment program.  I have re-evaluated this patient and find that the nature, scope, duration and intensity of the therapy is appropriate for the medical condition of the patient.    Svetlana continues to require the following intervention to meet STG and LTG's:  PT    Recommendations:  This patient would benefit from continued  therapy.     Frequency:  Patient will follow up in 1 month  Duration:  1 additional visit        Please refer to the daily flowsheet for treatment today, total treatment time and time spent performing 1:1 timed codes.

## 2018-04-30 NOTE — MR AVS SNAPSHOT
"              After Visit Summary   4/30/2018    Svetlana Mendoza    MRN: [id number]           Patient Information     Date Of Birth          1940        Visit Information        Provider Department      4/30/2018 1:30 PM Teresa Albright, PT; [facility] [employer] [facility] Physical Therapy        Today's Diagnoses     Bilateral low back pain with left-sided sciatica           Follow-ups after your visit        Your next 10 appointments already scheduled     May 24, 2018  1:30 PM CDT   [facility] Spine with Teresa Albright PT   [facility] Physical Therapy ([facility]  )    [address]  [facility] [address] [phone]              Who to contact     If you have questions or need follow up information about today's clinic visit or your schedule please contact [facility] PHYSICAL THERAPY directly at [phone].  Normal or non-critical lab and imaging results will be communicated to you by Respicardiahart, letter or phone within 4 business days after the clinic has received the results. If you do not hear from us within 7 days, please contact the clinic through Respicardiahart or phone. If you have a critical or abnormal lab result, we will notify you by phone as soon as possible.  Submit refill requests through Avidbots or call your pharmacy and they will forward the refill request to us. Please allow 3 business days for your refill to be completed.          Additional Information About Your Visit        RespicardiaharHuan Xiong Information     Avidbots lets you send messages to your doctor, view your test results, renew your prescriptions, schedule appointments and more. To sign up, go to [email or web address] . Click on \"Log in\" on the left side of the screen, which will take you to the Welcome page. Then click on \"Sign up Now\" on the right side of the page.     You will be asked to enter the access code listed below, as well as some personal information. " Please follow the directions to create your username and password.     Your access code is: [email or web address]  Expires: 7/15/2018  3:04 PM     Your access code will  in 90 days. If you need help or a new code, please call your [place] clinic or [phone].        Care EveryWhere ID     This is your Care EveryWhere ID. This could be used by other organizations to access your [place] medical records  [id number]         Blood Pressure from Last 3 Encounters:   13 140/56   13 160/74    Weight from Last 3 Encounters:   13 59.8 kg (131 lb 14.4 oz)              We Performed the Following     [facility] PROGRESS NOTES REPORT     PAYAL RE-CERT REPORT     MANUAL THER TECH,1+REGIONS,EA 15 MIN     THERAPEUTIC EXERCISES        Primary Care Provider Office Phone # Fax #    Ankita Coates MD PhD [phone]       [address]  [facility] [address]        Equal Access to Services     [facility]STINE [facility] Hospital[facility] : Hadii aad ku hadasho Soomaali, waaxda luqadaha, qaybta kaalmada adeegyada, waxay idiin hayaan adeeg kharash la'aan . So [facility] [phone].    ATENCIÓN: Si habla español, tiene a leavitt disposición servicios gratuitos de asistencia lingüística. Llame al [phone].    We comply with applicable federal civil rights laws and [place] laws. We do not discriminate on the basis of race, color, national origin, age, disability, sex, sexual orientation, or gender identity.            Thank you!     Thank you for choosing [facility] PHYSICAL THERAPY  for your care. Our goal is always to provide you with excellent care. Hearing back from our patients is one way we can continue to improve our services. Please take a few minutes to complete the written survey that you may receive in the mail after your visit with us. Thank you!             Your Updated Medication List - Protect others around you: Learn how to safely use, store and throw away your medicines at www.disposemymeds.org.          This list  is accurate as of 4/30/18  2:31 PM.  Always use your most recent med list.                   Brand Name Dispense Instructions for use Diagnosis    alendronate 70 MG tablet    FOSAMAX     Take 70 mg by mouth every 7 days        aspirin 81 MG tablet      Take by mouth daily        CALCIUM + D PO           Levothyroxine Sodium 50 MCG Caps           lisinopril-hydrochlorothiazide 20-12.5 MG per tablet    PRINZIDE/ZESTORETIC     Take 1 tablet by mouth daily        naproxen 500 MG tablet    NAPROSYN     Take by mouth 2 times daily (with meals)        omeprazole 20 MG tablet     30 tablet    Take 1 tablet (20 mg) by mouth daily as needed Take 30-60 minutes before a meal.    GERD (gastroesophageal reflux disease)       simvastatin 40 MG tablet    ZOCOR    90 tablet    Take 1 tablet (40 mg) by mouth At Bedtime    Hyperlipidemia LDL goal <130       vitamin D 2000 units Caps

## 2018-04-30 NOTE — LETTER
DEPARTMENT OF HEALTH AND HUMAN SERVICES  CENTERS FOR MEDICARE & MEDICAID SERVICES    PLAN/UPDATED PLAN OF PROGRESS FOR OUTPATIENT REHABILITATION    PATIENTS NAME:  Svetlana Mendoza     : 1940    PROVIDER NUMBER:    [id number]    [id number]     PROVIDER NAME: PAYAL MAPLE [facility] PHYSICAL THERAPY    MEDICAL RECORD NUMBER: [id number]     START OF CARE DATE:  SOC Date: 01/15/18   TYPE:  PT    PRIMARY/TREATMENT DIAGNOSIS: (Pertinent Medical Diagnosis)  Bilateral low back pain with left-sided sciatica    VISITS FROM START OF CARE:  Rxs Used: 11     PROGRESS  REPORT  Progress reporting period is from 3/15/2018 to 2018.       Patient has been seen for 4 visits during this reporting period and for 11 visits total.    SUBJECTIVE  Subjective: Patient reports symptoms are the same as last visit.  Does not really think about the pain very much during the day.  No difficulty sitting or rising from a chair.  Symptoms  most bothersome at night.  Intermittent disturbed sleep.  Since the last visit has had 5 good nights and about 5 nights with disturbed sleep.  Noticed recently feeling worse following going to the pool so did not go last week.      Current Pain level: 0/10.     Initial Pain level: 6/10.   Changes in function:  Yes (See Goal flowsheet attached for changes in current functional level)  Adverse reaction to treatment or activity: None    OBJECTIVE  Objective: LROM: Patient demonstrates nil loss of flexion and extension.  Reports ERP with flexion.  SLR (-) today, experiences B stretch but no pain.     ASSESSMENT/PLAN  Updated problem list and treatment plan:     Diagnosis 1:  LBP  Pain -  manual therapy, directional preference exercise and home program  Decreased ROM/flexibility - manual therapy, therapeutic exercise and home program  Decreased function - therapeutic activities and home program      STG/LTGs have been met or progress has been made towards goals:  Yes (See Goal flow sheet  "completed today.)    Assessment of Progress: The patient's condition is improving.    Self Management Plans:  Patient has been instructed in a home treatment program.  I have re-evaluated this patient and find that the nature, scope, duration and intensity of the therapy is appropriate for the medical condition of the patient.    Svetlana continues to require the following intervention to meet STG and LTG's:  PT    Recommendations:  This patient would benefit from continued therapy.     Frequency:  Patient will follow up in 1 month  Duration:  1 additional visit    Please refer to the daily flowsheet for treatment today, total treatment time and time spent performing 1:1 timed codes.    Caregiver Signature/Credentials _____________________________ Date ________       Treating Provider: Angelic Albright, PT     I have reviewed and certified the need for these services and plan of treatment while under my care.        PHYSICIAN'S SIGNATURE:   _____________________________________  Date___________                      Liliane Parada MD    Certification period:  Beginning of Cert date period: 04/15/18 to  End of Cert period date: 07/13/18     Functional Level Progress Report: Please see attached \"Goal Flow sheet for Functional level.\"    ____X____ Continue Services or       ________ DC Services                Service dates: From  SOC Date: 01/15/18 date to present                         "

## 2018-05-24 ENCOUNTER — THERAPY VISIT (OUTPATIENT)
Dept: PHYSICAL THERAPY | Facility: CLINIC | Age: 78
End: 2018-05-24
Payer: MEDICARE

## 2018-05-24 DIAGNOSIS — M54.42 BILATERAL LOW BACK PAIN WITH LEFT-SIDED SCIATICA: ICD-10-CM

## 2018-05-24 PROCEDURE — 97140 MANUAL THERAPY 1/> REGIONS: CPT | Mod: GP | Performed by: PHYSICAL THERAPIST

## 2018-05-24 PROCEDURE — 97110 THERAPEUTIC EXERCISES: CPT | Mod: GP | Performed by: PHYSICAL THERAPIST

## 2018-05-24 PROCEDURE — G8982 BODY POS GOAL STATUS: HCPCS | Mod: GP | Performed by: PHYSICAL THERAPIST

## 2018-05-24 PROCEDURE — G8983 BODY POS D/C STATUS: HCPCS | Mod: GP | Performed by: PHYSICAL THERAPIST

## 2018-05-24 NOTE — PROGRESS NOTES
Subjective:  HPI                    Objective:  System    Physical Exam    General     ROS    Assessment/Plan:    DISCHARGE REPORT    Progress reporting period is from 1/15/2018 to 5/24/2018.   Patient was seen for 12 visits.    SUBJECTIVE  Subjective: Patient reports good overall improvement.  Reports no pain duirng the day.  Sleep has improved, but does continue to wake up with leg pain.  Is comfortable with HEP and will continue independently.    Current Pain level: 0/10.     Initial Pain level: 6/10.   Changes in function:  Yes (See Goal flowsheet attached for changes in current functional level)  Adverse reaction to treatment or activity: None    OBJECTIVE  Patient is demonstrating full LROM in all directions without pain.  SLR is negative B.        ASSESSMENT/PLAN  Updated problem list and treatment plan: Diagnosis 1:  LBP  Pain -  home program  Decreased ROM/flexibility - home program  Decreased function - home program  STG/LTGs have been met or progress has been made towards goals:  Yes (See Goal flow sheet completed today.)  Assessment of Progress: The patient's condition is improving.  Self Management Plans:  Patient is independent in a home treatment program.    Svetlana continues to require the following intervention to meet STG and LTG's:  PT intervention is no longer required to meet STG/LTG.    Recommendations:  This patient is ready to be discharged from therapy and continue their home treatment program.    Please refer to the daily flowsheet for treatment today, total treatment time and time spent performing 1:1 timed codes.

## 2018-05-24 NOTE — MR AVS SNAPSHOT
"              After Visit Summary   2018    Svetlana Mendoza    MRN: [id number]           Patient Information     Date Of Birth          1940        Visit Information        Provider Department      2018 1:15 PM Teresa Albright PT; [facility] [employer] [facility] Physical Therapy        Today's Diagnoses     Bilateral low back pain with left-sided sciatica           Follow-ups after your visit        Who to contact     If you have questions or need follow up information about today's clinic visit or your schedule please contact [facility] PHYSICAL THERAPY directly at [phone].  Normal or non-critical lab and imaging results will be communicated to you by EduRisehart, letter or phone within 4 business days after the clinic has received the results. If you do not hear from us within 7 days, please contact the clinic through EduRisehart or phone. If you have a critical or abnormal lab result, we will notify you by phone as soon as possible.  Submit refill requests through [employer] or call your pharmacy and they will forward the refill request to us. Please allow 3 business days for your refill to be completed.          Additional Information About Your Visit        MyChart Information     [employer] lets you send messages to your doctor, view your test results, renew your prescriptions, schedule appointments and more. To sign up, go to [email or web address] . Click on \"Log in\" on the left side of the screen, which will take you to the Welcome page. Then click on \"Sign up Now\" on the right side of the page.     You will be asked to enter the access code listed below, as well as some personal information. Please follow the directions to create your username and password.     Your access code is: [email or web address]  Expires: 7/15/2018  3:04 PM     Your access code will  in 90 days. If you need help or a new code, please call your [place] clinic or " [phone].        Care EveryWhere ID     This is your Care EveryWhere ID. This could be used by other organizations to access your [place] medical records  [id number]         Blood Pressure from Last 3 Encounters:   08/13/13 140/56   08/01/13 160/74    Weight from Last 3 Encounters:   08/01/13 59.8 kg (131 lb 14.4 oz)              We Performed the Following     [facility] PROGRESS NOTES REPORT     MANUAL THER TECH,1+REGIONS,EA 15 MIN     THERAPEUTIC EXERCISES        Primary Care Provider Office Phone # Fax #    Ankita Coates MD PhD [phone]       [address]  [facility] [address]        Equal Access to Services     JONI MONDRAGON : Hadii aad ku hadasho Soomaali, waaxda luqadaha, qaybta kaalmada adeegyada, vinny michaels . So [facility] [phone].    ATENCIÓN: Si habla español, tiene a leavitt disposición servicios gratuitos de asistencia lingüística. Llame al [phone].    We comply with applicable federal civil rights laws and [place] laws. We do not discriminate on the basis of race, color, national origin, age, disability, sex, sexual orientation, or gender identity.            Thank you!     Thank you for choosing [facility] PHYSICAL THERAPY  for your care. Our goal is always to provide you with excellent care. Hearing back from our patients is one way we can continue to improve our services. Please take a few minutes to complete the written survey that you may receive in the mail after your visit with us. Thank you!             Your Updated Medication List - Protect others around you: Learn how to safely use, store and throw away your medicines at www.disposemymeds.org.          This list is accurate as of 5/24/18  3:14 PM.  Always use your most recent med list.                   Brand Name Dispense Instructions for use Diagnosis    alendronate 70 MG tablet    FOSAMAX     Take 70 mg by mouth every 7 days        aspirin 81 MG tablet      Take by mouth  daily        CALCIUM + D PO           Levothyroxine Sodium 50 MCG Caps           lisinopril-hydrochlorothiazide 20-12.5 MG per tablet    PRINZIDE/ZESTORETIC     Take 1 tablet by mouth daily        naproxen 500 MG tablet    NAPROSYN     Take by mouth 2 times daily (with meals)        omeprazole 20 MG tablet     30 tablet    Take 1 tablet (20 mg) by mouth daily as needed Take 30-60 minutes before a meal.    GERD (gastroesophageal reflux disease)       simvastatin 40 MG tablet    ZOCOR    90 tablet    Take 1 tablet (40 mg) by mouth At Bedtime    Hyperlipidemia LDL goal <130       vitamin D 2000 units Caps

## 2018-05-24 NOTE — LETTER
[facility] PHYSICAL THERAPY  [address]  [facility] [address]  [phone]    May 24, 2018    Re: Svetlana Mendoza   :   1940  MRN:  [id number]   REFERRING PHYSICIAN:   Liliane Parada    [facility] PHYSICAL THERAPY  Date of Initial Evaluation:  1/15/18  Visits:  Rxs Used: 12  Reason for Referral:  Bilateral low back pain with left-sided sciatica    DISCHARGE REPORT  Progress reporting period is from 1/15/2018 to 2018.       Patient was seen for 12 visits.    SUBJECTIVE  Subjective: Patient reports good overall improvement.  Reports no pain duirng the day.  Sleep has improved, but does continue to wake up with leg pain.  Is comfortable with HEP and will continue independently.      Current Pain level: 0/10.     Initial Pain level: 6/10.   Changes in function:  Yes (See Goal flowsheet attached for changes in current functional level)  Adverse reaction to treatment or activity: None    OBJECTIVE  Patient is demonstrating full LROM in all directions without pain.  SLR is negative B.        ASSESSMENT/PLAN  Updated problem list and treatment plan:     Diagnosis 1:  LBP  Pain -  home program  Decreased ROM/flexibility - home program  Decreased function - home program    STG/LTGs have been met or progress has been made towards goals:  Yes (See Goal flow sheet completed today.)    Assessment of Progress: The patient's condition is improving.  Self Management Plans:  Patient is independent in a home treatment program.    Svetlana continues to require the following intervention to meet STG and LTG's:  PT intervention is no longer required to meet STG/LTG.      Recommendations:  This patient is ready to be discharged from therapy and continue their home treatment program.    Thank you for your referral.    INQUIRIES  Therapist: Teresa Albright, PT, Cert. MDT  [facility] PHYSICAL THERAPY  [address]  [facility] [address]  Phone:  [phone]  Fax: [phone]

## 2018-07-17 ENCOUNTER — TRANSFERRED RECORDS (OUTPATIENT)
Dept: PHYSICAL THERAPY | Facility: CLINIC | Age: 78
End: 2018-07-17

## 2018-12-13 ENCOUNTER — THERAPY VISIT (OUTPATIENT)
Dept: PHYSICAL THERAPY | Facility: CLINIC | Age: 78
End: 2018-12-13
Payer: MEDICARE

## 2018-12-13 DIAGNOSIS — M54.50 LEFT-SIDED LOW BACK PAIN WITHOUT SCIATICA: Primary | ICD-10-CM

## 2018-12-13 PROCEDURE — 97140 MANUAL THERAPY 1/> REGIONS: CPT | Mod: GP | Performed by: PHYSICAL THERAPIST

## 2018-12-13 PROCEDURE — 97110 THERAPEUTIC EXERCISES: CPT | Mod: GP | Performed by: PHYSICAL THERAPIST

## 2018-12-13 PROCEDURE — G8981 BODY POS CURRENT STATUS: HCPCS | Mod: GP | Performed by: PHYSICAL THERAPIST

## 2018-12-13 PROCEDURE — G8982 BODY POS GOAL STATUS: HCPCS | Mod: GP | Performed by: PHYSICAL THERAPIST

## 2018-12-13 PROCEDURE — 97161 PT EVAL LOW COMPLEX 20 MIN: CPT | Mod: GP | Performed by: PHYSICAL THERAPIST

## 2018-12-13 NOTE — PROGRESS NOTES
[facility] Initial Evaluation  Subjective:  The history is provided by the patient. A  was used.                       Objective:  System    Physical Exam    General     ROS    Assessment/Plan:    {REHAB NOTES:623634}

## 2018-12-13 NOTE — LETTER
"DEPARTMENT OF HEALTH AND HUMAN SERVICES  CENTERS FOR MEDICARE & MEDICAID SERVICES    PLAN/UPDATED PLAN OF PROGRESS FOR OUTPATIENT REHABILITATION    PATIENTS NAME:  Svetlana Mendoza     : 1940    PROVIDER NUMBER:    [id number]    [id number]     PROVIDER NAME: [facility] PHYSICAL THERAP[facility]    MEDICAL RECORD NUMBER: [id number]     START OF CARE DATE:  SOC Date: 18   TYPE:  PT    PRIMARY/TREATMENT DIAGNOSIS: (Pertinent Medical Diagnosis)  Left-sided low back pain without sciatica    VISITS FROM START OF CARE:  Rxs Used: 1     [facility] Initial Evaluation -- Lumbar    Date: 2018  Svetlana Mendoza is a 78 year old female with a lumbar condition.   Referral: 2018  Work mechanical stresses:    Employment status:  Retired  Leisure mechanical stresses:   Functional disability score (TRU/STarT Back):  TRU 46, STarT Back 6/med  VAS score (0-10): 7/10 at worst  Patient goals/expectations:  Decrease LBP    HISTORY:    Present symptoms: L LBP, \"inflammation L LB\"  Pain quality (sharp/shooting/stabbing/aching/burning/cramping):  burning   Paresthesia (yes/no):  no    Present since (onset date): 2 months. Received PT orders 2018     Symptoms (improving/unchanging/worsening):  unchaning.     Symptoms commenced as a result of: no apparent reason   Condition occurred in the following environment:   home     Symptoms at onset (back/thigh/leg): L LB  Constant symptoms (back/thigh/leg):   Intermittent symptoms (back/thigh/leg): L LB    Symptoms are made worse with the following: Always Sitting 30 minutes  Symptoms are made better with the following: Always Walking    Disturbed sleep (yes/no):  Yes-related to long history of R LE pain   Sleeping postures (prone/sup/side R/L): varies    Previous episodes (0/1-5/6-10/11+): 11+   Year of first episode:     Previous history: Patient reports a long history of R LE pain and episodes of LBP.  Previous treatments: " Previous PT-helpful.  Reports previous exercise(demonstrates EIS) is now sometimes painful.  Prone lying is usually helpful.    Specific Questions:  Cough/Sneeze/Strain (pos/neg): neg  Bowel/Bladder (normal/abnormal): normal  Gait (normal/abnormal): normal  Medications (nil/NSAIDS/analg/steroids/anticoag/other):  Muscle relaxants and Other - Thyroid, Bone densisty and High blood pressure  Medical allergies:  no  General health (excellent/good/fair/poor):  fair  Pertinent medical history:  High blood pressure, Osteoarthritis and Thyroid problems  Imaging (None/Xray/MRI/Other):    Recent or major surgery (yes/no):  no  Night pain (yes/no): no  Accidents (yes/no): no  Unexplained weight loss (yes/no): no  Barriers at home:   Other red flags:     EXAMINATION    Posture:   Sitting (good/fair/poor): fair  Standing (good/fair/poor):good  Lordosis (red/acc/normal): normal  Correction of posture (better/worse/no effect): No effect today in clinic.  Reports use of lumbar roll at home is sometimes bothersome.    Lateral Shift (right/left/nil): nil  Relevant (yes/no):    Other Observations: pretzel stretch produces pain/stretch into L LB    Neurological:    Motor deficit:  None  Reflexes:    Sensory deficit:  None Dural signs:  (-)slump B.  SLR on L produces pain/stretch in L LBP.    Movement Loss:   Nawaf Mod Min Nil Pain   Flexion   x  NE   Extension   x  NE   Side Gliding R    x NE   Side Gliding L    x Prod L LBP     Test Movements:   During: produces, abolishes, increases, decreases, no effect, centralizing, peripheralizing   After: better, worse, no better, no worse, no effect, centralized, peripheralized    Pretest symptoms standin/10 LBP   Symptoms During Symptoms After ROM increased ROM decreased No Effect   FIS No Effect    No Effect         Rep FIS          EIS No Effect    No Effect         Rep EIS          Pretest symptoms lyin/10 LBP    Symptoms During Symptoms After ROM increased ROM decreased No Effect    BRIAN          Rep BRIAN          EIL          Rep EIL          If required, pretest symptoms: 0/10 LBP   Symptoms During Symptoms After ROM increased ROM decreased No Effect   SGIS - R No Effect    No Effect         Rep SGIS - R          SGIS - L Produces    No Worse         Rep SGIS - L No Effect    No Effect    abol sx with L SLR and pretzel stretch       Static Tests:  Sitting slouched:            Sitting erect:    Standing slouched           Standing erect:    Lying prone in extension:  NE, NE, dec sx with L SLR and pretzel stretch  Long sitting:      Other Tests:     Provisional Classification:  Derangement - Asymmetrical, unilateral, symptoms above knee-possible relevant lateral component    Principle of Management:  Education:           Equipment provided:    Mechanical therapy (Y/N):  Y   Extension principle:  EIS if not painful     Lateral Principle:  L SGIS x10 4-6x/day  Flexion principle:          Other:      ASSESSMENT/PLAN:    Patient is a 78 year old female with lumbar complaints.    Patient has the following significant findings with corresponding treatment plan.                Diagnosis 1:  LBP  Pain -  manual therapy, directional preference exercise and home program  Decreased ROM/flexibility - manual therapy, therapeutic exercise and home program  Decreased function - therapeutic activities and home program    Therapy Evaluation Codes:   1) History comprised of:   Personal factors that impact the plan of care:      None.    Comorbidity factors that impact the plan of care are:      None.     Medications impacting care: None.  2) Examination of Body Systems comprised of:   Body structures and functions that impact the plan of care:      Lumbar spine.   Activity limitations that impact the plan of care are:      Sitting.  3) Clinical presentation characteristics are:   Stable/Uncomplicated.  4) Decision-Making    Low complexity using standardized patient assessment instrument and/or measureable  "assessment of functional outcome.  Cumulative Therapy Evaluation is: Low complexity.    Previous and current functional limitations:  (See Goal Flow Sheet for this information)    Short term and Long term goals: (See Goal Flow Sheet for this information)     Communication ability:  Patient appears to be able to clearly communicate and understand verbal and written communication and follow directions correctly.  Treatment Explanation - The following has been discussed with the patient:   RX ordered/plan of care  Anticipated outcomes  Possible risks and side effects  This patient would benefit from PT intervention to resume normal activities.   Rehab potential is good.    Frequency:  1 X week, once daily  Duration:  for 12 weeks  Discharge Plan:  Achieve all LTG.  Independent in home treatment program.  Reach maximal therapeutic benefit.    Please refer to the daily flowsheet for treatment today, total treatment time and time spent performing 1:1 timed codes.       Caregiver Signature/Credentials _____________________________ Date ________       Treating Provider: Angelic Albright PT     I have reviewed and certified the need for these services and plan of treatment while under my care.        PHYSICIAN'S SIGNATURE:   ____________________________________  Date___________                 Nyasia Barlow MD     Certification period:  Beginning of Cert date period: 12/13/18 to  End of Cert period date: 03/12/19     Functional Level Progress Report: Please see attached \"Goal Flow sheet for Functional level.\"    ____X____ Continue Services or       ________ DC Services                Service dates: From  SOC Date: 12/13/18 date to present                         "

## 2018-12-13 NOTE — PROGRESS NOTES
"[facility] Initial Evaluation -- Lumbar    Date: December 13, 2018  Svetlana Mendoza is a 78 year old female with a lumbar condition.   Referral: 11/21/2018  Work mechanical stresses:    Employment status:  Retired  Leisure mechanical stresses:   Functional disability score (TRU/STarT Back):  TRU 46, STarT Back 6/med  VAS score (0-10): 7/10 at worst  Patient goals/expectations:  Decrease LBP    HISTORY:    Present symptoms: L LBP, \"inflammation L LB\"  Pain quality (sharp/shooting/stabbing/aching/burning/cramping):  burning   Paresthesia (yes/no):  no    Present since (onset date): 2 months.  Received PT orders 11/21/2018     Symptoms (improving/unchanging/worsening):  unchaning.     Symptoms commenced as a result of: no apparent reason   Condition occurred in the following environment:   home     Symptoms at onset (back/thigh/leg): L LB  Constant symptoms (back/thigh/leg):   Intermittent symptoms (back/thigh/leg): L LB    Symptoms are made worse with the following: Always Sitting 30 minutes  Symptoms are made better with the following: Always Walking    Disturbed sleep (yes/no):  Yes-related to long history of R LE pain   Sleeping postures (prone/sup/side R/L): varies    Previous episodes (0/1-5/6-10/11+): 11+   Year of first episode:     Previous history: Patient reports a long history of R LE pain and episodes of LBP.  Previous treatments: Previous PT-helpful.  Reports previous exercise(demonstrates EIS) is now sometimes painful.  Prone lying is usually helpful.      Specific Questions:  Cough/Sneeze/Strain (pos/neg): neg  Bowel/Bladder (normal/abnormal): normal  Gait (normal/abnormal): normal  Medications (nil/NSAIDS/analg/steroids/anticoag/other):  Muscle relaxants and Other - Thyroid, Bone densisty and High blood pressure  Medical allergies:  no  General health (excellent/good/fair/poor):  fair  Pertinent medical history:  High blood pressure, Osteoarthritis and Thyroid problems  Imaging " (None/Xray/MRI/Other):    Recent or major surgery (yes/no):  no  Night pain (yes/no): no  Accidents (yes/no): no  Unexplained weight loss (yes/no): no  Barriers at home:   Other red flags:     EXAMINATION    Posture:   Sitting (good/fair/poor): fair  Standing (good/fair/poor):good  Lordosis (red/acc/normal): normal  Correction of posture (better/worse/no effect): No effect today in clinic.  Reports use of lumbar roll at home is sometimes bothersome.    Lateral Shift (right/left/nil): nil  Relevant (yes/no):    Other Observations: pretzel stretch produces pain/stretch into L LB    Neurological:    Motor deficit:  None  Reflexes:    Sensory deficit:  None Dural signs:  (-)slump B.  SLR on L produces pain/stretch in L LBP.    Movement Loss:   Nawaf Mod Min Nil Pain   Flexion   x  NE   Extension   x  NE   Side Gliding R    x NE   Side Gliding L    x Prod L LBP     Test Movements:   During: produces, abolishes, increases, decreases, no effect, centralizing, peripheralizing   After: better, worse, no better, no worse, no effect, centralized, peripheralized    Pretest symptoms standin/10 LBP   Symptoms During Symptoms After ROM increased ROM decreased No Effect   FIS No Effect    No Effect         Rep FIS          EIS No Effect    No Effect         Rep EIS          Pretest symptoms lyin/10 LBP    Symptoms During Symptoms After ROM increased ROM decreased No Effect   BRIAN          Rep BRIAN          EIL          Rep EIL          If required, pretest symptoms: 0/10 LBP   Symptoms During Symptoms After ROM increased ROM decreased No Effect   SGIS - R No Effect    No Effect         Rep SGIS - R          SGIS - L Produces    No Worse         Rep SGIS - L No Effect    No Effect    abol sx with L SLR and pretzel stretch       Static Tests:  Sitting slouched:            Sitting erect:    Standing slouched           Standing erect:    Lying prone in extension:  NE, NE, dec sx with L SLR and pretzel stretch  Long sitting:       Other Tests:     Provisional Classification:  Derangement - Asymmetrical, unilateral, symptoms above knee-possible relevant lateral component    Principle of Management:  Education:         Equipment provided:    Mechanical therapy (Y/N):  Y   Extension principle:  EIS if not painful   Lateral Principle:  L SGIS x10 4-6x/day  Flexion principle:        Other:      ASSESSMENT/PLAN:    Patient is a 78 year old female with lumbar complaints.    Patient has the following significant findings with corresponding treatment plan.                Diagnosis 1:  LBP  Pain -  manual therapy, directional preference exercise and home program  Decreased ROM/flexibility - manual therapy, therapeutic exercise and home program  Decreased function - therapeutic activities and home program    Therapy Evaluation Codes:   1) History comprised of:   Personal factors that impact the plan of care:      None.    Comorbidity factors that impact the plan of care are:      None.     Medications impacting care: None.  2) Examination of Body Systems comprised of:   Body structures and functions that impact the plan of care:      Lumbar spine.   Activity limitations that impact the plan of care are:      Sitting.  3) Clinical presentation characteristics are:   Stable/Uncomplicated.  4) Decision-Making    Low complexity using standardized patient assessment instrument and/or measureable assessment of functional outcome.  Cumulative Therapy Evaluation is: Low complexity.    Previous and current functional limitations:  (See Goal Flow Sheet for this information)    Short term and Long term goals: (See Goal Flow Sheet for this information)     Communication ability:  Patient appears to be able to clearly communicate and understand verbal and written communication and follow directions correctly.  Treatment Explanation - The following has been discussed with the patient:   RX ordered/plan of care  Anticipated outcomes  Possible risks and side  effects  This patient would benefit from PT intervention to resume normal activities.   Rehab potential is good.    Frequency:  1 X week, once daily  Duration:  for 12 weeks  Discharge Plan:  Achieve all LTG.  Independent in home treatment program.  Reach maximal therapeutic benefit.    Please refer to the daily flowsheet for treatment today, total treatment time and time spent performing 1:1 timed codes.

## 2018-12-20 PROBLEM — M54.50 LEFT-SIDED LOW BACK PAIN WITHOUT SCIATICA: Status: ACTIVE | Noted: 2018-12-20

## 2018-12-27 ENCOUNTER — THERAPY VISIT (OUTPATIENT)
Dept: PHYSICAL THERAPY | Facility: CLINIC | Age: 78
End: 2018-12-27
Payer: MEDICARE

## 2018-12-27 DIAGNOSIS — M54.50 LEFT-SIDED LOW BACK PAIN WITHOUT SCIATICA: ICD-10-CM

## 2018-12-27 PROCEDURE — 97110 THERAPEUTIC EXERCISES: CPT | Mod: GP | Performed by: PHYSICAL THERAPIST

## 2018-12-27 PROCEDURE — 97140 MANUAL THERAPY 1/> REGIONS: CPT | Mod: GP | Performed by: PHYSICAL THERAPIST

## 2019-01-10 ENCOUNTER — THERAPY VISIT (OUTPATIENT)
Dept: PHYSICAL THERAPY | Facility: CLINIC | Age: 79
End: 2019-01-10
Payer: MEDICARE

## 2019-01-10 DIAGNOSIS — M54.50 LEFT-SIDED LOW BACK PAIN WITHOUT SCIATICA: ICD-10-CM

## 2019-01-10 PROCEDURE — 97140 MANUAL THERAPY 1/> REGIONS: CPT | Mod: GP | Performed by: PHYSICAL THERAPIST

## 2019-01-10 PROCEDURE — 97110 THERAPEUTIC EXERCISES: CPT | Mod: GP | Performed by: PHYSICAL THERAPIST

## 2019-01-10 PROCEDURE — 97530 THERAPEUTIC ACTIVITIES: CPT | Mod: GP | Performed by: PHYSICAL THERAPIST

## 2019-01-31 ENCOUNTER — THERAPY VISIT (OUTPATIENT)
Dept: PHYSICAL THERAPY | Facility: CLINIC | Age: 79
End: 2019-01-31
Payer: MEDICARE

## 2019-01-31 DIAGNOSIS — M54.50 LEFT-SIDED LOW BACK PAIN WITHOUT SCIATICA: ICD-10-CM

## 2019-01-31 PROCEDURE — 97530 THERAPEUTIC ACTIVITIES: CPT | Mod: GP | Performed by: PHYSICAL THERAPIST

## 2019-01-31 PROCEDURE — 97110 THERAPEUTIC EXERCISES: CPT | Mod: GP | Performed by: PHYSICAL THERAPIST

## 2019-01-31 PROCEDURE — 97140 MANUAL THERAPY 1/> REGIONS: CPT | Mod: GP | Performed by: PHYSICAL THERAPIST

## 2019-01-31 NOTE — PROGRESS NOTES
Subjective:  HPI                    Objective:  System    Physical Exam    General     ROS    Assessment/Plan:    PROGRESS  REPORT    Progress reporting period is from 12/13/2018 to 1/31/2019.  Patient has been seen for 4 visits.       SUBJECTIVE  Subjective: Patient reports LB is improved overall.  Experiences good and bad days.  Most painful activity is sitting.  Is able to sit 30-45 minutes.  Manages symptoms with exercises and walking.  Has no pain currently.    Current Pain level: 0/10.      Initial Pain level: 7/10.   Changes in function:  Yes (See Goal flowsheet attached for changes in current functional level)  Adverse reaction to treatment or activity: None    OBJECTIVE     Objective: LROM: Patient demonstrating nil loss in all directions.  LROM in non painful.  Posture: Patient sits with fair posture.  Reviewed use of lumbar roll today.  Patient will increase frequency of use of roll while sitting.     ASSESSMENT/PLAN  Updated problem list and treatment plan: Diagnosis 1:  LBP  Pain -  manual therapy, directional preference exercise and home program  Decreased function - therapeutic activities and home program  STG/LTGs have been met or progress has been made towards goals:  Yes (See Goal flow sheet completed today.)  Assessment of Progress: The patient's condition is improving.  Self Management Plans:  Patient has been instructed in a home treatment program.    Svetlana continues to require the following intervention to meet STG and LTG's:  PT    Recommendations:  This patient would benefit from continued therapy.     Frequency:  2 X a month, once daily  Duration:  for 2 months        Please refer to the daily flowsheet for treatment today, total treatment time and time spent performing 1:1 timed codes.

## 2019-02-14 ENCOUNTER — THERAPY VISIT (OUTPATIENT)
Dept: PHYSICAL THERAPY | Facility: CLINIC | Age: 79
End: 2019-02-14
Payer: MEDICARE

## 2019-02-14 DIAGNOSIS — M54.50 LEFT-SIDED LOW BACK PAIN WITHOUT SCIATICA: ICD-10-CM

## 2019-02-14 PROCEDURE — 97110 THERAPEUTIC EXERCISES: CPT | Mod: GP | Performed by: PHYSICAL THERAPIST

## 2019-02-14 PROCEDURE — 97140 MANUAL THERAPY 1/> REGIONS: CPT | Mod: GP | Performed by: PHYSICAL THERAPIST

## 2020-02-27 ENCOUNTER — ANCILLARY PROCEDURE (OUTPATIENT)
Dept: MRI IMAGING | Facility: CLINIC | Age: 80
End: 2020-02-27
Attending: PHYSICIAN ASSISTANT
Payer: MEDICARE

## 2020-02-27 DIAGNOSIS — M54.16 LUMBAR RADICULOPATHY: ICD-10-CM

## 2020-02-27 PROCEDURE — 72148 MRI LUMBAR SPINE W/O DYE: CPT | Performed by: RADIOLOGY

## 2020-03-13 ENCOUNTER — ANCILLARY PROCEDURE (OUTPATIENT)
Dept: BONE DENSITY | Facility: CLINIC | Age: 80
End: 2020-03-13
Attending: PHYSICIAN ASSISTANT
Payer: MEDICARE

## 2020-03-13 DIAGNOSIS — M89.9 DISORDER OF BONE: ICD-10-CM

## 2020-03-13 DIAGNOSIS — M81.0 OSTEOPOROSIS: ICD-10-CM

## 2020-03-13 DIAGNOSIS — N95.9 UNSPECIFIED MENOPAUSAL AND PERIMENOPAUSAL DISORDER: ICD-10-CM

## 2020-03-13 PROCEDURE — 77080 DXA BONE DENSITY AXIAL: CPT | Performed by: RADIOLOGY

## 2020-03-13 PROCEDURE — 77081 DXA BONE DENSITY APPENDICULR: CPT | Mod: 59 | Performed by: RADIOLOGY
